# Patient Record
Sex: MALE | Race: WHITE | NOT HISPANIC OR LATINO
[De-identification: names, ages, dates, MRNs, and addresses within clinical notes are randomized per-mention and may not be internally consistent; named-entity substitution may affect disease eponyms.]

---

## 2017-12-14 ENCOUNTER — APPOINTMENT (OUTPATIENT)
Dept: VASCULAR SURGERY | Facility: CLINIC | Age: 27
End: 2017-12-14
Payer: COMMERCIAL

## 2017-12-14 VITALS
HEART RATE: 95 BPM | WEIGHT: 188.13 LBS | TEMPERATURE: 98.8 F | SYSTOLIC BLOOD PRESSURE: 108 MMHG | HEIGHT: 75 IN | BODY MASS INDEX: 23.39 KG/M2 | DIASTOLIC BLOOD PRESSURE: 67 MMHG | OXYGEN SATURATION: 98 %

## 2017-12-14 DIAGNOSIS — Z82.61 FAMILY HISTORY OF ARTHRITIS: ICD-10-CM

## 2017-12-14 DIAGNOSIS — Z78.9 OTHER SPECIFIED HEALTH STATUS: ICD-10-CM

## 2017-12-14 PROBLEM — Z00.00 ENCOUNTER FOR PREVENTIVE HEALTH EXAMINATION: Status: ACTIVE | Noted: 2017-12-14

## 2017-12-14 PROCEDURE — 99243 OFF/OP CNSLTJ NEW/EST LOW 30: CPT | Mod: 25

## 2017-12-14 PROCEDURE — 93971 EXTREMITY STUDY: CPT

## 2017-12-14 RX ORDER — OXYCODONE HYDROCHLORIDE AND ACETAMINOPHEN 10; 325 MG/1; MG/1
TABLET ORAL
Refills: 0 | Status: ACTIVE | COMMUNITY

## 2017-12-14 RX ORDER — ASPIRIN 325 MG/1
TABLET, FILM COATED ORAL
Refills: 0 | Status: ACTIVE | COMMUNITY

## 2017-12-20 ENCOUNTER — APPOINTMENT (OUTPATIENT)
Dept: VASCULAR SURGERY | Facility: CLINIC | Age: 27
End: 2017-12-20
Payer: COMMERCIAL

## 2017-12-20 VITALS
OXYGEN SATURATION: 97 % | HEART RATE: 94 BPM | DIASTOLIC BLOOD PRESSURE: 68 MMHG | TEMPERATURE: 97.8 F | SYSTOLIC BLOOD PRESSURE: 108 MMHG

## 2017-12-20 PROCEDURE — 93971 EXTREMITY STUDY: CPT

## 2017-12-20 PROCEDURE — 99214 OFFICE O/P EST MOD 30 MIN: CPT | Mod: 25

## 2018-01-09 ENCOUNTER — APPOINTMENT (OUTPATIENT)
Dept: VASCULAR SURGERY | Facility: CLINIC | Age: 28
End: 2018-01-09
Payer: COMMERCIAL

## 2018-01-09 VITALS
OXYGEN SATURATION: 95 % | TEMPERATURE: 97.7 F | HEART RATE: 76 BPM | DIASTOLIC BLOOD PRESSURE: 69 MMHG | SYSTOLIC BLOOD PRESSURE: 107 MMHG

## 2018-01-09 DIAGNOSIS — I83.893 VARICOSE VEINS OF BILATERAL LOWER EXTREMITIES WITH OTHER COMPLICATIONS: ICD-10-CM

## 2018-01-09 PROCEDURE — 99214 OFFICE O/P EST MOD 30 MIN: CPT | Mod: 25

## 2018-01-09 PROCEDURE — 93971 EXTREMITY STUDY: CPT

## 2018-02-06 ENCOUNTER — APPOINTMENT (OUTPATIENT)
Dept: VASCULAR SURGERY | Facility: CLINIC | Age: 28
End: 2018-02-06
Payer: COMMERCIAL

## 2018-02-06 DIAGNOSIS — R25.2 CRAMP AND SPASM: ICD-10-CM

## 2018-02-06 PROCEDURE — 99214 OFFICE O/P EST MOD 30 MIN: CPT | Mod: 25

## 2018-02-06 PROCEDURE — 93971 EXTREMITY STUDY: CPT

## 2018-02-15 ENCOUNTER — APPOINTMENT (OUTPATIENT)
Dept: VASCULAR SURGERY | Facility: CLINIC | Age: 28
End: 2018-02-15
Payer: COMMERCIAL

## 2018-02-15 VITALS
SYSTOLIC BLOOD PRESSURE: 106 MMHG | OXYGEN SATURATION: 98 % | DIASTOLIC BLOOD PRESSURE: 72 MMHG | HEART RATE: 92 BPM | TEMPERATURE: 98.7 F

## 2018-02-15 DIAGNOSIS — I82.409 ACUTE EMBOLISM AND THROMBOSIS OF UNSPECIFIED DEEP VEINS OF UNSPECIFIED LOWER EXTREMITY: ICD-10-CM

## 2018-02-15 DIAGNOSIS — I80.232: ICD-10-CM

## 2018-02-15 PROCEDURE — 99214 OFFICE O/P EST MOD 30 MIN: CPT | Mod: 25

## 2018-02-15 PROCEDURE — 93971 EXTREMITY STUDY: CPT

## 2018-02-15 RX ORDER — ENOXAPARIN SODIUM 80 MG/.8ML
80 INJECTION SUBCUTANEOUS
Refills: 0 | Status: DISCONTINUED | COMMUNITY
End: 2018-02-15

## 2018-07-23 PROBLEM — Z78.9 ALCOHOL USE: Status: ACTIVE | Noted: 2017-12-14

## 2018-07-23 PROBLEM — I83.893 VARICOSE VEINS OF BILATERAL LOWER EXTREMITIES WITH OTHER COMPLICATIONS: Status: ACTIVE | Noted: 2017-12-14

## 2021-08-22 ENCOUNTER — INPATIENT (INPATIENT)
Facility: HOSPITAL | Age: 31
LOS: 2 days | Discharge: HOME | DRG: 454 | End: 2021-08-25
Attending: ORTHOPAEDIC SURGERY | Admitting: ORTHOPAEDIC SURGERY
Payer: COMMERCIAL

## 2021-08-22 VITALS
SYSTOLIC BLOOD PRESSURE: 126 MMHG | HEIGHT: 74 IN | WEIGHT: 190.04 LBS | TEMPERATURE: 99 F | OXYGEN SATURATION: 96 % | DIASTOLIC BLOOD PRESSURE: 78 MMHG | HEART RATE: 102 BPM | RESPIRATION RATE: 18 BRPM

## 2021-08-22 DIAGNOSIS — Z98.890 OTHER SPECIFIED POSTPROCEDURAL STATES: Chronic | ICD-10-CM

## 2021-08-22 LAB
ALBUMIN SERPL ELPH-MCNC: 4.9 G/DL — SIGNIFICANT CHANGE UP (ref 3.3–5)
ALP SERPL-CCNC: 49 U/L — SIGNIFICANT CHANGE UP (ref 40–120)
ALT FLD-CCNC: 17 U/L — SIGNIFICANT CHANGE UP (ref 10–45)
ANION GAP SERPL CALC-SCNC: 12 MMOL/L — SIGNIFICANT CHANGE UP (ref 5–17)
APTT BLD: 36.3 SEC — HIGH (ref 27.5–35.5)
AST SERPL-CCNC: 19 U/L — SIGNIFICANT CHANGE UP (ref 10–40)
BASOPHILS # BLD AUTO: 0.06 K/UL — SIGNIFICANT CHANGE UP (ref 0–0.2)
BASOPHILS NFR BLD AUTO: 0.9 % — SIGNIFICANT CHANGE UP (ref 0–2)
BILIRUB SERPL-MCNC: 0.2 MG/DL — SIGNIFICANT CHANGE UP (ref 0.2–1.2)
BLD GP AB SCN SERPL QL: NEGATIVE — SIGNIFICANT CHANGE UP
BUN SERPL-MCNC: 20 MG/DL — SIGNIFICANT CHANGE UP (ref 7–23)
CALCIUM SERPL-MCNC: 10 MG/DL — SIGNIFICANT CHANGE UP (ref 8.4–10.5)
CHLORIDE SERPL-SCNC: 101 MMOL/L — SIGNIFICANT CHANGE UP (ref 96–108)
CO2 SERPL-SCNC: 27 MMOL/L — SIGNIFICANT CHANGE UP (ref 22–31)
CREAT SERPL-MCNC: 1.09 MG/DL — SIGNIFICANT CHANGE UP (ref 0.5–1.3)
EOSINOPHIL # BLD AUTO: 0.39 K/UL — SIGNIFICANT CHANGE UP (ref 0–0.5)
EOSINOPHIL NFR BLD AUTO: 6 % — SIGNIFICANT CHANGE UP (ref 0–6)
GLUCOSE SERPL-MCNC: 112 MG/DL — HIGH (ref 70–99)
HCT VFR BLD CALC: 43.1 % — SIGNIFICANT CHANGE UP (ref 39–50)
HGB BLD-MCNC: 14.5 G/DL — SIGNIFICANT CHANGE UP (ref 13–17)
IMM GRANULOCYTES NFR BLD AUTO: 0.3 % — SIGNIFICANT CHANGE UP (ref 0–1.5)
INR BLD: 1.04 — SIGNIFICANT CHANGE UP (ref 0.88–1.16)
LYMPHOCYTES # BLD AUTO: 2.53 K/UL — SIGNIFICANT CHANGE UP (ref 1–3.3)
LYMPHOCYTES # BLD AUTO: 39 % — SIGNIFICANT CHANGE UP (ref 13–44)
MCHC RBC-ENTMCNC: 28.9 PG — SIGNIFICANT CHANGE UP (ref 27–34)
MCHC RBC-ENTMCNC: 33.6 GM/DL — SIGNIFICANT CHANGE UP (ref 32–36)
MCV RBC AUTO: 86 FL — SIGNIFICANT CHANGE UP (ref 80–100)
MONOCYTES # BLD AUTO: 0.44 K/UL — SIGNIFICANT CHANGE UP (ref 0–0.9)
MONOCYTES NFR BLD AUTO: 6.8 % — SIGNIFICANT CHANGE UP (ref 2–14)
NEUTROPHILS # BLD AUTO: 3.04 K/UL — SIGNIFICANT CHANGE UP (ref 1.8–7.4)
NEUTROPHILS NFR BLD AUTO: 47 % — SIGNIFICANT CHANGE UP (ref 43–77)
NRBC # BLD: 0 /100 WBCS — SIGNIFICANT CHANGE UP (ref 0–0)
PLATELET # BLD AUTO: 308 K/UL — SIGNIFICANT CHANGE UP (ref 150–400)
POTASSIUM SERPL-MCNC: 4 MMOL/L — SIGNIFICANT CHANGE UP (ref 3.5–5.3)
POTASSIUM SERPL-SCNC: 4 MMOL/L — SIGNIFICANT CHANGE UP (ref 3.5–5.3)
PROT SERPL-MCNC: 7.5 G/DL — SIGNIFICANT CHANGE UP (ref 6–8.3)
PROTHROM AB SERPL-ACNC: 12.4 SEC — SIGNIFICANT CHANGE UP (ref 10.6–13.6)
RBC # BLD: 5.01 M/UL — SIGNIFICANT CHANGE UP (ref 4.2–5.8)
RBC # FLD: 11.7 % — SIGNIFICANT CHANGE UP (ref 10.3–14.5)
RH IG SCN BLD-IMP: POSITIVE — SIGNIFICANT CHANGE UP
SARS-COV-2 RNA SPEC QL NAA+PROBE: SIGNIFICANT CHANGE UP
SODIUM SERPL-SCNC: 140 MMOL/L — SIGNIFICANT CHANGE UP (ref 135–145)
WBC # BLD: 6.48 K/UL — SIGNIFICANT CHANGE UP (ref 3.8–10.5)
WBC # FLD AUTO: 6.48 K/UL — SIGNIFICANT CHANGE UP (ref 3.8–10.5)

## 2021-08-22 PROCEDURE — 71045 X-RAY EXAM CHEST 1 VIEW: CPT | Mod: 26

## 2021-08-22 PROCEDURE — 99285 EMERGENCY DEPT VISIT HI MDM: CPT

## 2021-08-22 RX ORDER — HYDROMORPHONE HYDROCHLORIDE 2 MG/ML
0.5 INJECTION INTRAMUSCULAR; INTRAVENOUS; SUBCUTANEOUS EVERY 4 HOURS
Refills: 0 | Status: DISCONTINUED | OUTPATIENT
Start: 2021-08-22 | End: 2021-08-23

## 2021-08-22 RX ORDER — OXYCODONE HYDROCHLORIDE 5 MG/1
5 TABLET ORAL EVERY 4 HOURS
Refills: 0 | Status: DISCONTINUED | OUTPATIENT
Start: 2021-08-22 | End: 2021-08-23

## 2021-08-22 RX ORDER — SODIUM CHLORIDE 9 MG/ML
1000 INJECTION INTRAMUSCULAR; INTRAVENOUS; SUBCUTANEOUS ONCE
Refills: 0 | Status: COMPLETED | OUTPATIENT
Start: 2021-08-22 | End: 2021-08-22

## 2021-08-22 RX ORDER — MORPHINE SULFATE 50 MG/1
4 CAPSULE, EXTENDED RELEASE ORAL ONCE
Refills: 0 | Status: DISCONTINUED | OUTPATIENT
Start: 2021-08-22 | End: 2021-08-22

## 2021-08-22 RX ORDER — POVIDONE-IODINE 5 %
1 AEROSOL (ML) TOPICAL ONCE
Refills: 0 | Status: DISCONTINUED | OUTPATIENT
Start: 2021-08-22 | End: 2021-08-23

## 2021-08-22 RX ORDER — SODIUM CHLORIDE 9 MG/ML
1000 INJECTION, SOLUTION INTRAVENOUS
Refills: 0 | Status: DISCONTINUED | OUTPATIENT
Start: 2021-08-22 | End: 2021-08-23

## 2021-08-22 RX ORDER — CHLORHEXIDINE GLUCONATE 213 G/1000ML
1 SOLUTION TOPICAL
Refills: 0 | Status: DISCONTINUED | OUTPATIENT
Start: 2021-08-22 | End: 2021-08-23

## 2021-08-22 RX ORDER — ACETAMINOPHEN 500 MG
650 TABLET ORAL EVERY 6 HOURS
Refills: 0 | Status: DISCONTINUED | OUTPATIENT
Start: 2021-08-22 | End: 2021-08-23

## 2021-08-22 RX ORDER — OXYCODONE HYDROCHLORIDE 5 MG/1
10 TABLET ORAL EVERY 4 HOURS
Refills: 0 | Status: DISCONTINUED | OUTPATIENT
Start: 2021-08-22 | End: 2021-08-23

## 2021-08-22 RX ORDER — FLUOXETINE HCL 10 MG
20 CAPSULE ORAL DAILY
Refills: 0 | Status: DISCONTINUED | OUTPATIENT
Start: 2021-08-22 | End: 2021-08-23

## 2021-08-22 RX ADMIN — MORPHINE SULFATE 4 MILLIGRAM(S): 50 CAPSULE, EXTENDED RELEASE ORAL at 17:38

## 2021-08-22 RX ADMIN — MORPHINE SULFATE 4 MILLIGRAM(S): 50 CAPSULE, EXTENDED RELEASE ORAL at 18:07

## 2021-08-22 RX ADMIN — SODIUM CHLORIDE 1000 MILLILITER(S): 9 INJECTION INTRAMUSCULAR; INTRAVENOUS; SUBCUTANEOUS at 17:38

## 2021-08-22 NOTE — H&P ADULT - HISTORY OF PRESENT ILLNESS
Orthopaedic Surgery Consult Note    Attending Physician: Dr. Sanchez  Consult requested by: ED    CC: LIZZY numbness/weakness    HPI:  31yMale healthy presenting with low back pain for 5 weeks with recent worsening of pain despite taking medrol dose pack, tylenol/percocet. Pt endorses weakness of the left leg with numbness to the right thigh. Pt is seen by Dr. Sanchez and has a documented L4-L5 disc herniation. Pt was sent by Dr. Sanchez for pre-op and admission for TLIF of L4/L5 tomorrow. Denies recent trauma, blood thinners, fevers/chills, urinary or fecal incontinence.

## 2021-08-22 NOTE — ED CLERICAL - NS ED CLERK NOTE PRE-ARRIVAL INFORMATION; ADDITIONAL PRE-ARRIVAL INFORMATION
PT LILIANA MURO 30 Y/O MALE COMING IN WITH DISC HERNIATION AND NERVE DEFICIT. ADMIT TO DR MESSER SERVICE. RESIDENT WILL KNOW ABOUT HIM.

## 2021-08-22 NOTE — ED ADULT TRIAGE NOTE - CHIEF COMPLAINT QUOTE
Patient c/o of back and left leg pain, started 5 weeks ago after running, became worse today.  States  with numbness and tingling sensation to both legs, no other neuro/ motor deficits.  States Acetaminophen not working.

## 2021-08-22 NOTE — ED ADULT NURSE NOTE - OBJECTIVE STATEMENT
Patient AOX4 presents for follow up HCG and US. Patient reports vaginal bleeding--denies dizziness/SOB/CP. Speaking in full, complete sentences. Answering questions and following verbal commands appropriately. Patient presents AOX4 c/o L lower back pain radiating down LLE x 5 weeks. Patient reports hx of sciatica-- sent by PCP for r/o disc herniation. Patient reports intermittent numbness to LLE and pain with flexion and extension of L foot. Denies urinary/bowel incontinence.

## 2021-08-22 NOTE — ED ADULT NURSE NOTE - CHPI ED NUR SYMPTOMS NEG
no abdominal distension/no blood in stool/no burning urination/no chills/no diarrhea/no dysuria/no fever/no hematuria/no nausea/no vomiting no anorexia/no bladder dysfunction/no bowel dysfunction/no constipation/no difficulty bearing weight/no fatigue/no motor function loss/no neck tenderness/no numbness/no tingling

## 2021-08-22 NOTE — H&P ADULT - NSHPPHYSICALEXAM_GEN_ALL_CORE
Pt sitting in chair, in pain with LLE  Sensation: decreased sensation over the R anterior thigh, and L5 dermatome on the left  Pulses: 2+ pedal pulses, wwp  Strength: L 2/5 EHL, dorsiflexion, inversion, 3/5 eversion and knee flexion. 5/5 strength RLE  +straight leg raise

## 2021-08-22 NOTE — H&P ADULT - NSHPADDITIONALINFOADULT_GEN_ALL_CORE
***Notes/documentation in this chart by others:  Hospital policy requires me to provide "Attestation" statements and electronic signatures in this electronic note and/or elsewhere in this electronic chart.  However, the contents of this note, and/or documentation and/or notes by others in this chart/electronic record, have not been reviewed for accuracy.  Hospital policy requires me to provide attestations and electronic signatures  as well as to "agree with the history, physical exam and plan as documented by..." others.  However, it is not my usual and customary practice to review for accuracy and/or edit for accuracy the documentation and/or notes of others. In addition, unless I specifically provide documentation otherwise, I was not present during the history taking and examination by others in this medical record.  As a result, even thought I have provided the required attestations and electronic signatures in this chart, I cannot attest to the accuracy or contents of the notes and/or documentation of this note or by others in this electronic record.  Rajan Sanchez MD

## 2021-08-22 NOTE — H&P ADULT - NSHPLABSRESULTS_GEN_ALL_CORE
Labs:                        14.5   6.48  )-----------( 308      ( 22 Aug 2021 17:55 )             43.1     08-22    140  |  101  |  20  ----------------------------<  112<H>  4.0   |  27  |  1.09    Ca    10.0      22 Aug 2021 17:55    TPro  7.5  /  Alb  4.9  /  TBili  0.2  /  DBili  x   /  AST  19  /  ALT  17  /  AlkPhos  49  08-22    PT/INR - ( 22 Aug 2021 17:55 )   PT: 12.4 sec;   INR: 1.04          PTT - ( 22 Aug 2021 17:55 )  PTT:36.3 sec    Imaging:   MRI L spine: severe degenerative changes L4-5 with L4/5 disc herniation compressing the L5 nerve root.

## 2021-08-22 NOTE — ED ADULT NURSE NOTE - NSSUHOSCREENINGYN_ED_ALL_ED
Yes - the patient is able to be screened
How Severe Is Your Rash?: mild
Is This A New Presentation, Or A Follow-Up?: Rash

## 2021-08-22 NOTE — ED PROVIDER NOTE - OBJECTIVE STATEMENT
The pt is a 32 y/o M, who was sent to ED by dr da silva for pre op and surg vick. Pt states that has been having LBP x 5 wks, onset after a run, pain has been progressively getting worse, saw dr da silva and got an mri 4 d ago that showed herniated L 4-5, has taking medrol dose pack w/o relief, taking tyl and percocet w/some relief, c/o weakness to L leg, numbness to R thigh. Denies fevers, chills, blunt trauma, fall, cp, sob, n/v/d, loss of bowel or bladder control.

## 2021-08-22 NOTE — ED ADULT NURSE REASSESSMENT NOTE - NS ED NURSE REASSESS COMMENT FT1
Care transferred to inpatient holding. Pt laying flat in stretcher for comfort. Given pillow and blanket.

## 2021-08-22 NOTE — H&P ADULT - ASSESSMENT
A/P: 31yMale with L4/L5 disc herniation, OR today for TLIF L4/L5 with Tindel  -stable  -pain control with ERAS protocol per Laura  -NPO for OR today  -consented, preop'd  -PT, WBS: WBAT  -Dispo; pending OR    Ever Ayoub, PGY-1  Ortho Pager [phone]

## 2021-08-22 NOTE — ED PROVIDER NOTE - MUSCULOSKELETAL, MLM
+ tend over lower spine, FROM, no CVA tend, L LE 3/5 strength, R LE 5/5 strength, decreased sensation to R thigh, FROM b/l LE detailed exam

## 2021-08-22 NOTE — ED PROVIDER NOTE - CLINICAL SUMMARY MEDICAL DECISION MAKING FREE TEXT BOX
pt sent in by dr da silva for admission - has been having lbp x 5 wks, now w/acute weakness to L LE and numbness to R thigh, took steroids w/o relief, taking pain meds, mri w/herniated disk - plan is surg vick. no bowel/bladder incontinence, ambulatory, + weakness on exam, pre op labs done, pt admitted to ortho as per dr da silva

## 2021-08-23 ENCOUNTER — RESULT REVIEW (OUTPATIENT)
Age: 31
End: 2021-08-23

## 2021-08-23 LAB
APPEARANCE UR: CLEAR — SIGNIFICANT CHANGE UP
BACTERIA # UR AUTO: PRESENT /HPF
BILIRUB UR-MCNC: NEGATIVE — SIGNIFICANT CHANGE UP
BLD GP AB SCN SERPL QL: NEGATIVE — SIGNIFICANT CHANGE UP
COLOR SPEC: YELLOW — SIGNIFICANT CHANGE UP
COVID-19 SPIKE DOMAIN AB INTERP: POSITIVE
COVID-19 SPIKE DOMAIN ANTIBODY RESULT: >250 U/ML — HIGH
DIFF PNL FLD: ABNORMAL
GLUCOSE UR QL: NEGATIVE — SIGNIFICANT CHANGE UP
KETONES UR-MCNC: NEGATIVE — SIGNIFICANT CHANGE UP
LEUKOCYTE ESTERASE UR-ACNC: NEGATIVE — SIGNIFICANT CHANGE UP
NITRITE UR-MCNC: NEGATIVE — SIGNIFICANT CHANGE UP
PH UR: 6 — SIGNIFICANT CHANGE UP (ref 5–8)
PROT UR-MCNC: NEGATIVE MG/DL — SIGNIFICANT CHANGE UP
RBC CASTS # UR COMP ASSIST: < 5 /HPF — SIGNIFICANT CHANGE UP
RH IG SCN BLD-IMP: POSITIVE — SIGNIFICANT CHANGE UP
SARS-COV-2 IGG+IGM SERPL QL IA: >250 U/ML — HIGH
SARS-COV-2 IGG+IGM SERPL QL IA: POSITIVE
SP GR SPEC: 1.02 — SIGNIFICANT CHANGE UP (ref 1–1.03)
URATE CRY FLD QL MICRO: ABNORMAL /HPF
UROBILINOGEN FLD QL: 0.2 E.U./DL — SIGNIFICANT CHANGE UP
WBC UR QL: < 5 /HPF — SIGNIFICANT CHANGE UP

## 2021-08-23 PROCEDURE — 88304 TISSUE EXAM BY PATHOLOGIST: CPT | Mod: 26

## 2021-08-23 RX ORDER — HYDROMORPHONE HYDROCHLORIDE 2 MG/ML
0.5 INJECTION INTRAMUSCULAR; INTRAVENOUS; SUBCUTANEOUS EVERY 4 HOURS
Refills: 0 | Status: DISCONTINUED | OUTPATIENT
Start: 2021-08-23 | End: 2021-08-24

## 2021-08-23 RX ORDER — ACETAMINOPHEN 500 MG
650 TABLET ORAL EVERY 6 HOURS
Refills: 0 | Status: DISCONTINUED | OUTPATIENT
Start: 2021-08-23 | End: 2021-08-24

## 2021-08-23 RX ORDER — HYDROMORPHONE HYDROCHLORIDE 2 MG/ML
0.5 INJECTION INTRAMUSCULAR; INTRAVENOUS; SUBCUTANEOUS
Refills: 0 | Status: DISCONTINUED | OUTPATIENT
Start: 2021-08-23 | End: 2021-08-24

## 2021-08-23 RX ORDER — FLUOXETINE HCL 10 MG
20 CAPSULE ORAL DAILY
Refills: 0 | Status: DISCONTINUED | OUTPATIENT
Start: 2021-08-23 | End: 2021-08-25

## 2021-08-23 RX ORDER — OXYCODONE HYDROCHLORIDE 5 MG/1
5 TABLET ORAL EVERY 4 HOURS
Refills: 0 | Status: DISCONTINUED | OUTPATIENT
Start: 2021-08-23 | End: 2021-08-24

## 2021-08-23 RX ORDER — FLUOXETINE HCL 10 MG
1 CAPSULE ORAL
Qty: 30 | Refills: 0
Start: 2021-08-23 | End: 2021-09-21

## 2021-08-23 RX ORDER — FINASTERIDE 5 MG/1
1 TABLET, FILM COATED ORAL
Qty: 30 | Refills: 0
Start: 2021-08-23 | End: 2021-09-21

## 2021-08-23 RX ORDER — SODIUM CHLORIDE 9 MG/ML
1000 INJECTION, SOLUTION INTRAVENOUS
Refills: 0 | Status: DISCONTINUED | OUTPATIENT
Start: 2021-08-23 | End: 2021-08-24

## 2021-08-23 RX ORDER — SENNA PLUS 8.6 MG/1
2 TABLET ORAL AT BEDTIME
Refills: 0 | Status: DISCONTINUED | OUTPATIENT
Start: 2021-08-23 | End: 2021-08-25

## 2021-08-23 RX ORDER — POLYETHYLENE GLYCOL 3350 17 G/17G
17 POWDER, FOR SOLUTION ORAL DAILY
Refills: 0 | Status: DISCONTINUED | OUTPATIENT
Start: 2021-08-23 | End: 2021-08-25

## 2021-08-23 RX ORDER — CEFAZOLIN SODIUM 1 G
2000 VIAL (EA) INJECTION EVERY 8 HOURS
Refills: 0 | Status: COMPLETED | OUTPATIENT
Start: 2021-08-23 | End: 2021-08-24

## 2021-08-23 RX ORDER — OXYCODONE HYDROCHLORIDE 5 MG/1
10 TABLET ORAL EVERY 6 HOURS
Refills: 0 | Status: DISCONTINUED | OUTPATIENT
Start: 2021-08-23 | End: 2021-08-24

## 2021-08-23 RX ADMIN — Medication 100 MILLIGRAM(S): at 17:58

## 2021-08-23 RX ADMIN — Medication 20 MILLIGRAM(S): at 17:58

## 2021-08-23 RX ADMIN — OXYCODONE HYDROCHLORIDE 5 MILLIGRAM(S): 5 TABLET ORAL at 04:10

## 2021-08-23 RX ADMIN — OXYCODONE HYDROCHLORIDE 5 MILLIGRAM(S): 5 TABLET ORAL at 19:02

## 2021-08-23 RX ADMIN — POLYETHYLENE GLYCOL 3350 17 GRAM(S): 17 POWDER, FOR SOLUTION ORAL at 21:28

## 2021-08-23 RX ADMIN — HYDROMORPHONE HYDROCHLORIDE 0.5 MILLIGRAM(S): 2 INJECTION INTRAMUSCULAR; INTRAVENOUS; SUBCUTANEOUS at 14:40

## 2021-08-23 RX ADMIN — SENNA PLUS 2 TABLET(S): 8.6 TABLET ORAL at 21:27

## 2021-08-23 RX ADMIN — OXYCODONE HYDROCHLORIDE 5 MILLIGRAM(S): 5 TABLET ORAL at 05:10

## 2021-08-23 RX ADMIN — Medication 50 MILLIGRAM(S): at 21:28

## 2021-08-23 RX ADMIN — OXYCODONE HYDROCHLORIDE 5 MILLIGRAM(S): 5 TABLET ORAL at 18:02

## 2021-08-23 RX ADMIN — SODIUM CHLORIDE 125 MILLILITER(S): 9 INJECTION, SOLUTION INTRAVENOUS at 14:21

## 2021-08-23 RX ADMIN — HYDROMORPHONE HYDROCHLORIDE 0.5 MILLIGRAM(S): 2 INJECTION INTRAMUSCULAR; INTRAVENOUS; SUBCUTANEOUS at 14:25

## 2021-08-23 NOTE — PRE-OP CHECKLIST - BMI (KG/M2)
Patient left voice message to schedule appointment, returned patients call left voice message to return call. 24.4

## 2021-08-24 ENCOUNTER — TRANSCRIPTION ENCOUNTER (OUTPATIENT)
Age: 31
End: 2021-08-24

## 2021-08-24 LAB
ANION GAP SERPL CALC-SCNC: 8 MMOL/L — SIGNIFICANT CHANGE UP (ref 5–17)
BASOPHILS # BLD AUTO: 0.02 K/UL — SIGNIFICANT CHANGE UP (ref 0–0.2)
BASOPHILS NFR BLD AUTO: 0.2 % — SIGNIFICANT CHANGE UP (ref 0–2)
BUN SERPL-MCNC: 17 MG/DL — SIGNIFICANT CHANGE UP (ref 7–23)
CALCIUM SERPL-MCNC: 9 MG/DL — SIGNIFICANT CHANGE UP (ref 8.4–10.5)
CHLORIDE SERPL-SCNC: 103 MMOL/L — SIGNIFICANT CHANGE UP (ref 96–108)
CO2 SERPL-SCNC: 29 MMOL/L — SIGNIFICANT CHANGE UP (ref 22–31)
CREAT SERPL-MCNC: 1.13 MG/DL — SIGNIFICANT CHANGE UP (ref 0.5–1.3)
EOSINOPHIL # BLD AUTO: 0.06 K/UL — SIGNIFICANT CHANGE UP (ref 0–0.5)
EOSINOPHIL NFR BLD AUTO: 0.6 % — SIGNIFICANT CHANGE UP (ref 0–6)
GLUCOSE SERPL-MCNC: 101 MG/DL — HIGH (ref 70–99)
HCT VFR BLD CALC: 35.1 % — LOW (ref 39–50)
HGB BLD-MCNC: 11.5 G/DL — LOW (ref 13–17)
IMM GRANULOCYTES NFR BLD AUTO: 0.3 % — SIGNIFICANT CHANGE UP (ref 0–1.5)
LYMPHOCYTES # BLD AUTO: 1.93 K/UL — SIGNIFICANT CHANGE UP (ref 1–3.3)
LYMPHOCYTES # BLD AUTO: 19.6 % — SIGNIFICANT CHANGE UP (ref 13–44)
MCHC RBC-ENTMCNC: 29.4 PG — SIGNIFICANT CHANGE UP (ref 27–34)
MCHC RBC-ENTMCNC: 32.8 GM/DL — SIGNIFICANT CHANGE UP (ref 32–36)
MCV RBC AUTO: 89.8 FL — SIGNIFICANT CHANGE UP (ref 80–100)
MONOCYTES # BLD AUTO: 0.79 K/UL — SIGNIFICANT CHANGE UP (ref 0–0.9)
MONOCYTES NFR BLD AUTO: 8 % — SIGNIFICANT CHANGE UP (ref 2–14)
NEUTROPHILS # BLD AUTO: 7.01 K/UL — SIGNIFICANT CHANGE UP (ref 1.8–7.4)
NEUTROPHILS NFR BLD AUTO: 71.3 % — SIGNIFICANT CHANGE UP (ref 43–77)
NRBC # BLD: 0 /100 WBCS — SIGNIFICANT CHANGE UP (ref 0–0)
PLATELET # BLD AUTO: 209 K/UL — SIGNIFICANT CHANGE UP (ref 150–400)
POTASSIUM SERPL-MCNC: 3.9 MMOL/L — SIGNIFICANT CHANGE UP (ref 3.5–5.3)
POTASSIUM SERPL-SCNC: 3.9 MMOL/L — SIGNIFICANT CHANGE UP (ref 3.5–5.3)
RBC # BLD: 3.91 M/UL — LOW (ref 4.2–5.8)
RBC # FLD: 11.9 % — SIGNIFICANT CHANGE UP (ref 10.3–14.5)
SODIUM SERPL-SCNC: 140 MMOL/L — SIGNIFICANT CHANGE UP (ref 135–145)
WBC # BLD: 9.84 K/UL — SIGNIFICANT CHANGE UP (ref 3.8–10.5)
WBC # FLD AUTO: 9.84 K/UL — SIGNIFICANT CHANGE UP (ref 3.8–10.5)

## 2021-08-24 RX ORDER — CYCLOBENZAPRINE HYDROCHLORIDE 10 MG/1
5 TABLET, FILM COATED ORAL ONCE
Refills: 0 | Status: COMPLETED | OUTPATIENT
Start: 2021-08-24 | End: 2021-08-24

## 2021-08-24 RX ORDER — POLYETHYLENE GLYCOL 3350 17 G/17G
17 POWDER, FOR SOLUTION ORAL
Qty: 0 | Refills: 0 | DISCHARGE
Start: 2021-08-24

## 2021-08-24 RX ORDER — ACETAMINOPHEN 500 MG
3 TABLET ORAL
Qty: 0 | Refills: 0 | DISCHARGE
Start: 2021-08-24

## 2021-08-24 RX ORDER — HYDROMORPHONE HYDROCHLORIDE 2 MG/ML
0.5 INJECTION INTRAMUSCULAR; INTRAVENOUS; SUBCUTANEOUS
Refills: 0 | Status: DISCONTINUED | OUTPATIENT
Start: 2021-08-24 | End: 2021-08-25

## 2021-08-24 RX ORDER — CYCLOBENZAPRINE HYDROCHLORIDE 10 MG/1
1 TABLET, FILM COATED ORAL
Qty: 21 | Refills: 0
Start: 2021-08-24 | End: 2021-08-30

## 2021-08-24 RX ORDER — MAGNESIUM HYDROXIDE 400 MG/1
30 TABLET, CHEWABLE ORAL DAILY
Refills: 0 | Status: DISCONTINUED | OUTPATIENT
Start: 2021-08-24 | End: 2021-08-25

## 2021-08-24 RX ORDER — FINASTERIDE 5 MG/1
1 TABLET, FILM COATED ORAL DAILY
Refills: 0 | Status: DISCONTINUED | OUTPATIENT
Start: 2021-08-24 | End: 2021-08-24

## 2021-08-24 RX ORDER — ACETAMINOPHEN 500 MG
975 TABLET ORAL EVERY 8 HOURS
Refills: 0 | Status: DISCONTINUED | OUTPATIENT
Start: 2021-08-24 | End: 2021-08-25

## 2021-08-24 RX ORDER — OXYCODONE HYDROCHLORIDE 5 MG/1
10 TABLET ORAL
Refills: 0 | Status: DISCONTINUED | OUTPATIENT
Start: 2021-08-24 | End: 2021-08-25

## 2021-08-24 RX ORDER — OXYCODONE HYDROCHLORIDE 5 MG/1
5 TABLET ORAL
Refills: 0 | Status: DISCONTINUED | OUTPATIENT
Start: 2021-08-24 | End: 2021-08-25

## 2021-08-24 RX ORDER — CYCLOBENZAPRINE HYDROCHLORIDE 10 MG/1
5 TABLET, FILM COATED ORAL THREE TIMES A DAY
Refills: 0 | Status: DISCONTINUED | OUTPATIENT
Start: 2021-08-24 | End: 2021-08-25

## 2021-08-24 RX ORDER — SENNA PLUS 8.6 MG/1
2 TABLET ORAL
Qty: 0 | Refills: 0 | DISCHARGE
Start: 2021-08-24

## 2021-08-24 RX ORDER — OXYCODONE HYDROCHLORIDE 5 MG/1
1 TABLET ORAL
Qty: 40 | Refills: 0
Start: 2021-08-24 | End: 2021-08-30

## 2021-08-24 RX ADMIN — Medication 50 MILLIGRAM(S): at 14:13

## 2021-08-24 RX ADMIN — SODIUM CHLORIDE 125 MILLILITER(S): 9 INJECTION, SOLUTION INTRAVENOUS at 00:24

## 2021-08-24 RX ADMIN — POLYETHYLENE GLYCOL 3350 17 GRAM(S): 17 POWDER, FOR SOLUTION ORAL at 12:11

## 2021-08-24 RX ADMIN — Medication 20 MILLIGRAM(S): at 12:11

## 2021-08-24 RX ADMIN — Medication 50 MILLIGRAM(S): at 05:03

## 2021-08-24 RX ADMIN — OXYCODONE HYDROCHLORIDE 5 MILLIGRAM(S): 5 TABLET ORAL at 06:17

## 2021-08-24 RX ADMIN — Medication 975 MILLIGRAM(S): at 21:39

## 2021-08-24 RX ADMIN — OXYCODONE HYDROCHLORIDE 5 MILLIGRAM(S): 5 TABLET ORAL at 07:17

## 2021-08-24 RX ADMIN — CYCLOBENZAPRINE HYDROCHLORIDE 5 MILLIGRAM(S): 10 TABLET, FILM COATED ORAL at 12:38

## 2021-08-24 RX ADMIN — OXYCODONE HYDROCHLORIDE 5 MILLIGRAM(S): 5 TABLET ORAL at 18:46

## 2021-08-24 RX ADMIN — Medication 50 MILLIGRAM(S): at 21:39

## 2021-08-24 RX ADMIN — SENNA PLUS 2 TABLET(S): 8.6 TABLET ORAL at 21:40

## 2021-08-24 RX ADMIN — OXYCODONE HYDROCHLORIDE 10 MILLIGRAM(S): 5 TABLET ORAL at 10:19

## 2021-08-24 RX ADMIN — OXYCODONE HYDROCHLORIDE 10 MILLIGRAM(S): 5 TABLET ORAL at 11:19

## 2021-08-24 RX ADMIN — Medication 975 MILLIGRAM(S): at 22:39

## 2021-08-24 RX ADMIN — OXYCODONE HYDROCHLORIDE 5 MILLIGRAM(S): 5 TABLET ORAL at 19:46

## 2021-08-24 RX ADMIN — Medication 100 MILLIGRAM(S): at 00:24

## 2021-08-24 RX ADMIN — Medication 1 TABLET(S): at 12:11

## 2021-08-24 NOTE — PHYSICAL THERAPY INITIAL EVALUATION ADULT - PERTINENT HX OF CURRENT PROBLEM, REHAB EVAL
31yMale healthy presenting with low back pain for 5 weeks with recent worsening of pain despite taking medrol dose pack, tylenol/percocet. Pt endorses weakness of the left leg with numbness to the right thigh. Pt is seen by Dr. Sanchez and has a documented L4-L5 disc herniation. Pt was sent by Dr. Sanchez for pre-op and admission for TLIF of L4/L5 tomorrow. Denies recent trauma, blood thinners, fevers/chills, urinary or fecal incontinence.

## 2021-08-24 NOTE — DISCHARGE NOTE PROVIDER - NSDCMRMEDTOKEN_GEN_ALL_CORE_FT
acetaminophen 325 mg oral tablet: 3 tab(s) orally every 8 hours for one week, then as needed  finasteride 1 mg oral tablet: 1 tab(s) orally once a day   FLUoxetine 20 mg oral capsule: 1 cap(s) orally once a day   Multiple Vitamins oral tablet: 1 tab(s) orally once a day  oxyCODONE 5 mg oral tablet: 1 to 2 tab(s) orally every 4 hours, As Needed -Moderate to severe Pain (4 - 6) MDD:12  polyethylene glycol 3350 oral powder for reconstitution: 17 gram(s) orally once a day  pregabalin 50 mg oral capsule: 1 cap(s) orally every 8 hours MDD:3  senna oral tablet: 2 tab(s) orally once a day (at bedtime)   acetaminophen 325 mg oral tablet: 3 tab(s) orally every 8 hours for one week, then as needed  cyclobenzaprine 5 mg oral tablet: 1 tab(s) orally 3 times a day, As needed, Muscle Spasm  finasteride 1 mg oral tablet: 1 tab(s) orally once a day   FLUoxetine 20 mg oral capsule: 1 cap(s) orally once a day   Multiple Vitamins oral tablet: 1 tab(s) orally once a day  oxyCODONE 5 mg oral tablet: 1 to 2 tab(s) orally every 4 hours, As Needed -Moderate to severe Pain (4 - 6) MDD:12  polyethylene glycol 3350 oral powder for reconstitution: 17 gram(s) orally once a day  pregabalin 50 mg oral capsule: 1 cap(s) orally every 8 hours MDD:3  senna oral tablet: 2 tab(s) orally once a day (at bedtime)   acetaminophen 325 mg oral tablet: 3 tab(s) orally every 8 hours for one week, then as needed  Aspirin Enteric Coated 81 mg oral delayed release tablet: 1 tab(s) orally once a day   cyclobenzaprine 5 mg oral tablet: 1 tab(s) orally 3 times a day, As needed, Muscle Spasm  finasteride 1 mg oral tablet: 1 tab(s) orally once a day   FLUoxetine 20 mg oral capsule: 1 cap(s) orally once a day   Multiple Vitamins oral tablet: 1 tab(s) orally once a day  oxyCODONE 5 mg oral tablet: 1 to 2 tab(s) orally every 4 hours, As Needed -Moderate to severe Pain (4 - 6) MDD:12  polyethylene glycol 3350 oral powder for reconstitution: 17 gram(s) orally once a day  pregabalin 50 mg oral capsule: 1 cap(s) orally every 8 hours MDD:3  senna oral tablet: 2 tab(s) orally once a day (at bedtime)

## 2021-08-24 NOTE — DISCHARGE NOTE PROVIDER - HOSPITAL COURSE
Admitted 8/22/21  Surgery TLIF L4-5  Shahana-op Antibiotics  Pain control  DVT prophylaxis  OOB/Physical Therapy

## 2021-08-24 NOTE — DISCHARGE NOTE PROVIDER - CARE PROVIDER_API CALL
Rajan Sanchez  ORTHOPAEDIC SURGERY  [address], [address] [place] [address]  Phone: [phone]  Fax: [phone]  Follow Up Time: 2 weeks

## 2021-08-24 NOTE — DISCHARGE NOTE PROVIDER - NSDCFUADDINST_GEN_ALL_CORE_FT
No strenuous activity (bending/twisting), heavy lifting, driving or returning to work until cleared by MD.  Try to have regular bowel movements, take stool softener or laxative if necessary.  May apply ice to affected areas to decrease swelling.  Call to schedule an appt with Dr. Sanchez for follow up, if you have staples or sutures they will be removed in office.  Contact your doctor if you experience: fever greater than 101.5, chills, chest pain, difficulty breathing, redness or excessive drainage around the incision, other concerns.  Follow up with your primary care provider.    **You may take showers. Keep the battery pack dry. You may disconnect the dressing from the battery pack prior to showers and keep away from water. To do this, hold the on/off button down until it turns off, close the clamp on the tubing, then disconnect the tubing from the battery pack. Do the reverse to turn it back on.  No soaking in bathtubs.  The dressing has a battery that usually dies in 7 days. Once this occurs, you may remove the dressing and dispose of it, then leave incision open to air. Keep incision clean and dry**.   No strenuous activity (bending/twisting), heavy lifting, driving or returning to work until cleared by MD.  Try to have regular bowel movements, take stool softener or laxative if necessary.  May apply ice to affected areas to decrease swelling.  Call to schedule an appt with Dr. Sanchez for follow up, if you have staples or sutures they will be removed in office.  Contact your doctor if you experience: fever greater than 101.5, chills, chest pain, difficulty breathing, redness or excessive drainage around the incision, other concerns.  Follow up with your primary care provider.    **You may take showers. Keep the battery pack dry. You may disconnect the dressing from the battery pack prior to showers and keep away from water. To do this, hold the on/off button down until it turns off, close the clamp on the tubing, then disconnect the tubing from the battery pack. Do the reverse to turn it back on.  No soaking in bathtubs.  The dressing has a battery that usually dies in 7 days. Once this occurs, you may remove the dressing and dispose of it, then leave incision open to air. Keep incision clean and dry**.  Take Baby Aspirin 81 mg daily for 30days to prevent blood clots.

## 2021-08-24 NOTE — DISCHARGE NOTE PROVIDER - NSDCCPCAREPLAN_GEN_ALL_CORE_FT
PRINCIPAL DISCHARGE DIAGNOSIS  Diagnosis: Low back pain  Assessment and Plan of Treatment: TLIF L4-5

## 2021-08-24 NOTE — PHYSICAL THERAPY INITIAL EVALUATION ADULT - ADDITIONAL COMMENTS
Pt lives in an apartment alone, 4 steps down to enter + 2 FOS inside.  Pt denies recent history of falls.  Pt reports he owns SC and AC but has not used them.

## 2021-08-24 NOTE — PHYSICAL THERAPY INITIAL EVALUATION ADULT - GENERAL OBSERVATIONS, REHAB EVAL
Patient received semi-rojo in bed in NAD on RA, +SCDs, +Heplock, +hemovac x2, +prevena. Cleared by RN. Agreeable to PT.

## 2021-08-25 ENCOUNTER — TRANSCRIPTION ENCOUNTER (OUTPATIENT)
Age: 31
End: 2021-08-25

## 2021-08-25 VITALS
HEART RATE: 96 BPM | TEMPERATURE: 98 F | SYSTOLIC BLOOD PRESSURE: 110 MMHG | DIASTOLIC BLOOD PRESSURE: 67 MMHG | OXYGEN SATURATION: 98 % | RESPIRATION RATE: 16 BRPM

## 2021-08-25 LAB
ANION GAP SERPL CALC-SCNC: 8 MMOL/L — SIGNIFICANT CHANGE UP (ref 5–17)
BASOPHILS # BLD AUTO: 0.03 K/UL — SIGNIFICANT CHANGE UP (ref 0–0.2)
BASOPHILS NFR BLD AUTO: 0.3 % — SIGNIFICANT CHANGE UP (ref 0–2)
BUN SERPL-MCNC: 10 MG/DL — SIGNIFICANT CHANGE UP (ref 7–23)
CALCIUM SERPL-MCNC: 9.4 MG/DL — SIGNIFICANT CHANGE UP (ref 8.4–10.5)
CHLORIDE SERPL-SCNC: 100 MMOL/L — SIGNIFICANT CHANGE UP (ref 96–108)
CO2 SERPL-SCNC: 30 MMOL/L — SIGNIFICANT CHANGE UP (ref 22–31)
CREAT SERPL-MCNC: 1.05 MG/DL — SIGNIFICANT CHANGE UP (ref 0.5–1.3)
EOSINOPHIL # BLD AUTO: 0.11 K/UL — SIGNIFICANT CHANGE UP (ref 0–0.5)
EOSINOPHIL NFR BLD AUTO: 1.2 % — SIGNIFICANT CHANGE UP (ref 0–6)
GLUCOSE SERPL-MCNC: 129 MG/DL — HIGH (ref 70–99)
HCT VFR BLD CALC: 37.9 % — LOW (ref 39–50)
HGB BLD-MCNC: 12.3 G/DL — LOW (ref 13–17)
IMM GRANULOCYTES NFR BLD AUTO: 0.3 % — SIGNIFICANT CHANGE UP (ref 0–1.5)
LYMPHOCYTES # BLD AUTO: 1.8 K/UL — SIGNIFICANT CHANGE UP (ref 1–3.3)
LYMPHOCYTES # BLD AUTO: 19.9 % — SIGNIFICANT CHANGE UP (ref 13–44)
MCHC RBC-ENTMCNC: 29.1 PG — SIGNIFICANT CHANGE UP (ref 27–34)
MCHC RBC-ENTMCNC: 32.5 GM/DL — SIGNIFICANT CHANGE UP (ref 32–36)
MCV RBC AUTO: 89.8 FL — SIGNIFICANT CHANGE UP (ref 80–100)
MONOCYTES # BLD AUTO: 0.83 K/UL — SIGNIFICANT CHANGE UP (ref 0–0.9)
MONOCYTES NFR BLD AUTO: 9.2 % — SIGNIFICANT CHANGE UP (ref 2–14)
NEUTROPHILS # BLD AUTO: 6.23 K/UL — SIGNIFICANT CHANGE UP (ref 1.8–7.4)
NEUTROPHILS NFR BLD AUTO: 69.1 % — SIGNIFICANT CHANGE UP (ref 43–77)
NRBC # BLD: 0 /100 WBCS — SIGNIFICANT CHANGE UP (ref 0–0)
PLATELET # BLD AUTO: 213 K/UL — SIGNIFICANT CHANGE UP (ref 150–400)
POTASSIUM SERPL-MCNC: 3.6 MMOL/L — SIGNIFICANT CHANGE UP (ref 3.5–5.3)
POTASSIUM SERPL-SCNC: 3.6 MMOL/L — SIGNIFICANT CHANGE UP (ref 3.5–5.3)
RBC # BLD: 4.22 M/UL — SIGNIFICANT CHANGE UP (ref 4.2–5.8)
RBC # FLD: 12.1 % — SIGNIFICANT CHANGE UP (ref 10.3–14.5)
SODIUM SERPL-SCNC: 138 MMOL/L — SIGNIFICANT CHANGE UP (ref 135–145)
WBC # BLD: 9.03 K/UL — SIGNIFICANT CHANGE UP (ref 3.8–10.5)
WBC # FLD AUTO: 9.03 K/UL — SIGNIFICANT CHANGE UP (ref 3.8–10.5)

## 2021-08-25 RX ORDER — ASPIRIN/CALCIUM CARB/MAGNESIUM 324 MG
1 TABLET ORAL
Qty: 30 | Refills: 0
Start: 2021-08-25 | End: 2021-09-23

## 2021-08-25 RX ADMIN — Medication 50 MILLIGRAM(S): at 05:19

## 2021-08-25 RX ADMIN — Medication 975 MILLIGRAM(S): at 05:19

## 2021-08-25 RX ADMIN — Medication 975 MILLIGRAM(S): at 06:19

## 2021-08-25 NOTE — PROGRESS NOTE ADULT - SUBJECTIVE AND OBJECTIVE BOX
patient seen, examined.   Cleared by PT  only notes thigh numbness- radicular symptoms resolved.  back "tightness" noted  voiding well  exam unchanged.  no calf tenderness  imp: postoperative day 2  cleared for discharge  call for questions, fevers, chillls or any wound drainaing  followup in 7-10 dyas  no NSAIDS or exposure to cigarrette smoke  vit d and calcium encouraged  
Did well overnight except with expected surgical pain.  bilateral numbness in toes still noted but radicular pain is resolved.  toe movement is minimally improved.  Has not been out of bed yet.   tolerating diet.  no nausea/vomiting; using incentive spirometry  PE: motor 5/5 except left great toe extension 3/5 and inversion 4/5 and knee flexion 4/5  sensory intact  no calf tenderness  drain output noted/pauly in place  imp;: doing well postop day #1  PT- out of bed this AM walking  pain management  incentive spirometry/scds  regular diet  follow drain output  plan discharge in AM if doing well  check routine labs in AM  
Ortho Note    Patient reports bilateral thigh numbness, present preop, pain moderately controlled  Denies CP, SOB, N/V    Vital Signs Last 24 Hrs  T(C): 36.8 (08-25-21 @ 08:36), Max: 37.6 (08-25-21 @ 04:49)  T(F): 98.3 (08-25-21 @ 08:36), Max: 99.6 (08-25-21 @ 04:49)  HR: 96 (08-25-21 @ 08:36) (96 - 108)  BP: 110/67 (08-25-21 @ 08:36) (109/65 - 110/67)  BP(mean): --  RR: 16 (08-25-21 @ 08:36) (16 - 17)  SpO2: 98% (08-25-21 @ 08:36) (97% - 98%)  AVSS    General: Pt Alert and oriented, NAD  DSG: lumbar Prevena functioning well  Pulses: bilateral pedal 2+, wwp toes, cap refill < 3sec toes  Sensation: bilateral LEs SILT  Motor: LLE 3+4-/5 , RLE 5/5     08-24    140  |  103  |  17  ----------------------------<  101<H>  3.9   |  29  |  1.13    Ca    9.0      24 Aug 2021 08:06                            11.5   9.84  )-----------( 209      ( 24 Aug 2021 08:06 )             35.1       A/P: 31y Male POD#2 s/p TLIF L4-5  - Stable, afebrile in am, nontoxic appearance  - Pain Control: po meds  - DVT ppx: SCDs  - PT, WBS: WBAT spinal precautions  -Bowel regimen: continue bowel regimen  -Dispo: home today    Ortho Pager [phone]
Ortho Note    Patient reports bilateral thighs numbness present preop, pain moderately controlled  Denies CP, SOB, N/V     Vital Signs Last 24 Hrs  T(C): 37 (08-24-21 @ 08:21), Max: 37 (08-24-21 @ 08:21)  T(F): 98.6 (08-24-21 @ 08:21), Max: 98.6 (08-24-21 @ 08:21)  HR: 75 (08-24-21 @ 11:00) (74 - 75)  BP: 114/73 (08-24-21 @ 11:00) (112/72 - 114/73)  BP(mean): --  RR: 17 (08-24-21 @ 08:21) (17 - 17)  SpO2: 99% (08-24-21 @ 11:00) (99% - 99%)  AVSS    General: Pt Alert and oriented, NAD  DSG: lumbar Prevena functioning well, HV x2 to suction  Pulses: bilateral pedal 2+, wwp toes, cap refill < 3sec toes  Sensation: bilateral LEs SILT  Motor: LLE 4-/5 EHL/FHL/TA/GS    08-24    140  |  103  |  17  ----------------------------<  101<H>  3.9   |  29  |  1.13    Ca    9.0      24 Aug 2021 08:06    TPro  7.5  /  Alb  4.9  /  TBili  0.2  /  DBili  x   /  AST  19  /  ALT  17  /  AlkPhos  49  08-22                          11.5   9.84  )-----------( 209      ( 24 Aug 2021 08:06 )             35.1       A/P: 31y Male POD#1 s/p TLIF L4-5  - Stable, afebrile, nontoxic appearance, IS encouraged  - Pain Control: po meds  - DVT ppx: SCDS  - PT, WBS: WBAT spinal precautions  - monitor drain in pm  - Rose removed, passed TOV  -Bowel regimen: continue bowel regimen  -Dispo: home pending drains output and PT clearance.    Ortho Pager [phone]
Ortho Note    Pt slightly sleepy post op, comfortable without complaints, pain moderately controlled  Denies CP, SOB, N/V     Vital Signs Last 24 Hrs  T(C): 36.7 (08-23-21 @ 15:28), Max: 36.7 (08-23-21 @ 15:28)  T(F): 98 (08-23-21 @ 15:28), Max: 98 (08-23-21 @ 15:28)  HR: 79 (08-23-21 @ 15:28) (71 - 89)  BP: 104/63 (08-23-21 @ 15:28) (101/54 - 115/57)  BP(mean): 75 (08-23-21 @ 14:45) (73 - 80)  RR: 17 (08-23-21 @ 15:28) (10 - 17)  SpO2: 98% (08-23-21 @ 15:28) (98% - 100%)  AVSS    General: Pt Alert and oriented, NAD  DSG: prevena functioning well, HV x2 to suction  Pulses: bilateral pedal 2+, wwp toes, cap refill < 3sec toes  Sensation: slightly diminished bilateral thigh area present preop  Motor: LLE 3/5 EHL/FHL/TA/GS present preop, RLE 5/5 08-22    140  |  101  |  20  ----------------------------<  112<H>  4.0   |  27  |  1.09    Ca    10.0      22 Aug 2021 17:55    TPro  7.5  /  Alb  4.9  /  TBili  0.2  /  DBili  x   /  AST  19  /  ALT  17  /  AlkPhos  49  08-22                          14.5   6.48  )-----------( 308      ( 22 Aug 2021 17:55 )             43.1       A/P: 31y Male POD#0 s/p TLIF L4-5  - Stable, afebrile, nontoxic appearance, IS encouraged  - Pain Control: po meds  - remove Rose after PT session  - DVT ppx: SCDs  - PT, WBS: WBAT spinal precautions  -Bowel regimen: continue bowel regimen  -Dispo: pending PT eval    Ortho Pager [phone]
Patient seen and examined in the emergency room presenting with severe left lumbar radiculpathy with progressive neurological deficit.  Patient has a history of prior uncomplicated microdiscectomy 2019.  He presents with incapacitating pain and weakness in the extremity.  Examination notable for 2/5 great toe extension, dorsiflexion and inversion and 3/5 eversion and knee flexion.  Positive straight leg raise.  Diminished reflexes are noted distally.  MRI shows severe degenerative changes L4-5 with large recurrent displaced disc compressing the L5 nerve root.  The condition and treatment options were discussed with the patient.  The risks, benefits and alternatives to revision laminectomy and decompression and spinal fusion with bone grafting and pedicle screws, rods and intervertebral spacer were discussed.  The primary goal of surgery is to improve back pain due to degeneration of the L4-5 disc and lower extremity radicular pain due to compression of the nerve root.  Full recovery of the neurological deficit cannot be guaranteed, even with surgery. The complications of surgery were discussed and include, but are not limited to, wound problems, infection, bleeding, vascular injury, nerve injury, paralysis, csf leak, recurrent stenosis, instability, need for further surgery, junctional/adjacent level disease, nonunion, pseudarthrosis, need for dural repair, hardware failure, loss of fixation, persistent symptoms, need for further surgery of any kind, lack of full relief of all symptoms,  deep vein thrombosis, pulmonary embolus, cardiac event, stroke and death.  All questions answered, informed consent was obtained.      ******Notes/documentation by others:  Despite the "electronic signature(s)" I provide in this electronic note and/or elsewhere in this electronic chart, the contents of this note, and/or documentation and/or notes by others in this chart and/or electronic record have not been reviewed and not been proofread for accuracy.  Hospital policy requires me to provide electronic signatures in the medical record and to "agree with the history, physical exam and plan as documented by..." others, but it is not my usual and customary practice to review the documentation and/or notes of others.  As a result, despite the mandatory signature(s) in this chart, I cannot attest to the accuracy or contents of the notes and/or documentation of others.    
The patient was seen and re-examined.  He continues to describe unchanged symptoms with motor deficits as documented on admission.  The risks, benefits and alternatives to surgery were again discussed.  all questions were answered and   Informed consent was obtained.

## 2021-08-25 NOTE — DISCHARGE NOTE NURSING/CASE MANAGEMENT/SOCIAL WORK - PATIENT PORTAL LINK FT
You can access the FollowMyHealth Patient Portal offered by [facility] by registering at the following website: [email or web address]. By joining [employer]’s FollowMyHealth portal, you will also be able to view your health information using other applications (apps) compatible with our system.

## 2021-08-25 NOTE — DISCHARGE NOTE NURSING/CASE MANAGEMENT/SOCIAL WORK - NSDCPEFALRISK_GEN_ALL_CORE
For information on Fall & injury Prevention, visit [email or web address]

## 2021-08-25 NOTE — PROGRESS NOTE ADULT - REASON FOR ADMISSION
LE numbness/weakness
LE numbness/weakness
Recurrent disc displacement L4-5 with neurological deficit/foot drop
same
same
LE numbness/weakness
same

## 2021-08-27 DIAGNOSIS — M53.2X6 SPINAL INSTABILITIES, LUMBAR REGION: ICD-10-CM

## 2021-08-27 DIAGNOSIS — M96.1 POSTLAMINECTOMY SYNDROME, NOT ELSEWHERE CLASSIFIED: ICD-10-CM

## 2021-08-27 DIAGNOSIS — M48.062 SPINAL STENOSIS, LUMBAR REGION WITH NEUROGENIC CLAUDICATION: ICD-10-CM

## 2021-08-27 DIAGNOSIS — M51.16 INTERVERTEBRAL DISC DISORDERS WITH RADICULOPATHY, LUMBAR REGION: ICD-10-CM

## 2021-08-27 DIAGNOSIS — M51.06 INTERVERTEBRAL DISC DISORDERS WITH MYELOPATHY, LUMBAR REGION: ICD-10-CM

## 2021-08-27 DIAGNOSIS — M21.372 FOOT DROP, LEFT FOOT: ICD-10-CM

## 2021-08-31 LAB — SURGICAL PATHOLOGY STUDY: SIGNIFICANT CHANGE UP

## 2021-09-14 PROCEDURE — 85610 PROTHROMBIN TIME: CPT

## 2021-09-14 PROCEDURE — 86850 RBC ANTIBODY SCREEN: CPT

## 2021-09-14 PROCEDURE — 86901 BLOOD TYPING SEROLOGIC RH(D): CPT

## 2021-09-14 PROCEDURE — 86900 BLOOD TYPING SEROLOGIC ABO: CPT

## 2021-09-14 PROCEDURE — 86769 SARS-COV-2 COVID-19 ANTIBODY: CPT

## 2021-09-14 PROCEDURE — 80053 COMPREHEN METABOLIC PANEL: CPT

## 2021-09-14 PROCEDURE — 36415 COLL VENOUS BLD VENIPUNCTURE: CPT

## 2021-09-14 PROCEDURE — 71045 X-RAY EXAM CHEST 1 VIEW: CPT

## 2021-09-14 PROCEDURE — 85025 COMPLETE CBC W/AUTO DIFF WBC: CPT

## 2021-09-14 PROCEDURE — 80048 BASIC METABOLIC PNL TOTAL CA: CPT

## 2021-09-14 PROCEDURE — 88304 TISSUE EXAM BY PATHOLOGIST: CPT

## 2021-09-14 PROCEDURE — 76000 FLUOROSCOPY <1 HR PHYS/QHP: CPT

## 2021-09-14 PROCEDURE — 87635 SARS-COV-2 COVID-19 AMP PRB: CPT

## 2021-09-14 PROCEDURE — C1889: CPT

## 2021-09-14 PROCEDURE — 97161 PT EVAL LOW COMPLEX 20 MIN: CPT

## 2021-09-14 PROCEDURE — 81001 URINALYSIS AUTO W/SCOPE: CPT

## 2021-09-14 PROCEDURE — C1713: CPT

## 2021-09-14 PROCEDURE — 99285 EMERGENCY DEPT VISIT HI MDM: CPT | Mod: 25

## 2021-09-14 PROCEDURE — 85730 THROMBOPLASTIN TIME PARTIAL: CPT

## 2022-06-10 NOTE — ED PROVIDER NOTE - IV ALTEPLASE DOOR HIDDEN
Patient Specific Counseling (Will Not Stick From Patient To Patient): -patient to start using hydrocortisone 2.5 % topical cream Qd Detail Level: Zone show
